# Patient Record
Sex: MALE | Race: WHITE | NOT HISPANIC OR LATINO | URBAN - METROPOLITAN AREA
[De-identification: names, ages, dates, MRNs, and addresses within clinical notes are randomized per-mention and may not be internally consistent; named-entity substitution may affect disease eponyms.]

---

## 2019-08-05 ENCOUNTER — HOSPITAL ENCOUNTER (EMERGENCY)
Facility: HOSPITAL | Age: 44
Discharge: HOME | End: 2019-08-05
Attending: INTERNAL MEDICINE
Payer: MEDICAID

## 2019-08-05 VITALS
TEMPERATURE: 98 F | HEART RATE: 76 BPM | OXYGEN SATURATION: 99 % | WEIGHT: 275 LBS | SYSTOLIC BLOOD PRESSURE: 151 MMHG | RESPIRATION RATE: 16 BRPM | DIASTOLIC BLOOD PRESSURE: 73 MMHG

## 2019-08-05 DIAGNOSIS — S49.90XA SHOULDER INJURY, INITIAL ENCOUNTER: ICD-10-CM

## 2019-08-05 DIAGNOSIS — S99.912A ANKLE INJURY, LEFT, INITIAL ENCOUNTER: ICD-10-CM
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
## 2019-08-05 PROCEDURE — 99284 EMERGENCY DEPT VISIT MOD MDM: CPT | Mod: ER

## 2019-08-05 RX ORDER — IBUPROFEN 800 MG/1
800 TABLET ORAL EVERY 6 HOURS PRN
Qty: 20 TABLET | Refills: 0 | Status: SHIPPED | OUTPATIENT
Start: 2019-08-05

## 2019-08-05 RX ORDER — METHOCARBAMOL 500 MG/1
1000 TABLET, FILM COATED ORAL 3 TIMES DAILY
Qty: 30 TABLET | Refills: 0 | Status: SHIPPED | OUTPATIENT
Start: 2019-08-05 | End: 2019-08-10

## 2019-08-05 NOTE — ED PROVIDER NOTES
"Encounter Date: 8/5/2019    SCRIBE #1 NOTE: I, Amy Rahman, am scribing for, and in the presence of,  CHETNA Mcneill. I have scribed the following portions of the note - Other sections scribed: HPI, ROS, PE.       History     Chief Complaint   Patient presents with    Back Pain     Pt states," I fell off a ladder three days ago and have pain in my lower back and left hip."     Ariel Hinson is a 44 y.o. male who presents to the ED complaining of left lower back pain s/p falling off roof 2 days ago. The ladder slipped and he fell with the ladder. No LOC or head injury. Denies history of back problems. Denies numbness or tingling in legs. No incontinence. He is taking Aleve for pain. Reports left ankle and left shoulder pain, but notes that pain has improved. He is able to walk on ankle.    The history is provided by the patient.     Review of patient's allergies indicates:   Allergen Reactions    Bactrim [sulfamethoxazole-trimethoprim] Nausea Only     History reviewed. No pertinent past medical history.  History reviewed. No pertinent surgical history.  History reviewed. No pertinent family history.  Social History     Tobacco Use    Smoking status: Never Smoker    Smokeless tobacco: Never Used   Substance Use Topics    Alcohol use: Not on file    Drug use: Not on file     Review of Systems   Respiratory: Negative for shortness of breath.    Cardiovascular: Negative for chest pain.   Genitourinary: Negative for enuresis.   Musculoskeletal: Positive for arthralgias and back pain. Negative for gait problem.   Skin: Negative for wound.   Neurological: Negative for syncope, numbness and headaches.   All other systems reviewed and are negative.      Physical Exam     Initial Vitals [08/05/19 1211]   BP Pulse Resp Temp SpO2   (!) 151/73 76 16 97.7 °F (36.5 °C) 99 %      MAP       --         Physical Exam    Nursing note and vitals reviewed.  Constitutional: He appears well-developed and well-nourished. "   HENT:   Head: Normocephalic and atraumatic.   Eyes: Conjunctivae are normal.   Neck: Normal range of motion. Neck supple.   Cardiovascular: Normal rate, regular rhythm, normal heart sounds and intact distal pulses. Exam reveals no gallop and no friction rub.    No murmur heard.  Pulmonary/Chest: Breath sounds normal. No respiratory distress. He has no wheezes. He has no rhonchi. He has no rales.   Musculoskeletal: Normal range of motion. He exhibits tenderness (left shoulder, ankle, back ). He exhibits no edema.   Left lumbar paraspinous tenderness. No midline tenderness. Tenderness to lateral malleolous of left ankle.   Neurological: He is alert and oriented to person, place, and time. He has normal strength. No cranial nerve deficit or sensory deficit. GCS score is 15. GCS eye subscore is 4. GCS verbal subscore is 5. GCS motor subscore is 6.   Skin: Skin is warm, dry and intact. Capillary refill takes less than 2 seconds.   Psychiatric: He has a normal mood and affect. His behavior is normal.         ED Course   Procedures  Labs Reviewed - No data to display       Imaging Results          X-Ray Shoulder 2 or More Views Left (Final result)  Result time 08/05/19 13:41:16   Procedure changed from X-Ray Shoulder Trauma Left     Final result by Bucky Rosas MD (08/05/19 13:41:16)                 Impression:      No acute displaced fracture-dislocation identified.      Electronically signed by: Bucky Rosas MD  Date:    08/05/2019  Time:    13:41             Narrative:    EXAMINATION:  XR SHOULDER COMPLETE 2 OR MORE VIEWS LEFT    CLINICAL HISTORY:  pain;Unspecified injury of shoulder and upper arm, unspecified arm, initial encounter    TECHNIQUE:  Two or three views of the left shoulder were performed.    COMPARISON:  None    FINDINGS:  Bones are well mineralized. Overall alignment is within normal limits. No displaced fracture, dislocation or destructive osseous process.  Minimal degenerative change at the AC  joint.  No subcutaneous emphysema or radiodense retained foreign body.  Left lung apex is clear.                               X-Ray Lumbar Spine Ap And Lateral (Final result)  Result time 08/05/19 13:29:29    Final result by Yung Da Silva MD (08/05/19 13:29:29)                 Impression:      No acute findings      Electronically signed by: Yung Da Silva MD  Date:    08/05/2019  Time:    13:29             Narrative:    EXAMINATION:  XR LUMBAR SPINE AP AND LATERAL    CLINICAL HISTORY:  Low back pain, minor trauma;    TECHNIQUE:  AP, lateral and spot images were performed of the lumbar spine.    COMPARISON:  None    FINDINGS:  There is minimal curvature of the lumbar spine to the left.  Otherwise alignment is satisfactory.  No spondylolisthesis is seen.  Body heights and disc space heights are maintained.  No compression fracture identified                               X-Ray Ankle Complete Left (Final result)  Result time 08/05/19 13:27:11    Final result by Yung Da Silva MD (08/05/19 13:27:11)                 Impression:      Degenerative changes.  No fracture or dislocation.      Electronically signed by: Yung Da Silva MD  Date:    08/05/2019  Time:    13:27             Narrative:    EXAMINATION:  XR ANKLE COMPLETE 3 VIEW LEFT    CLINICAL HISTORY:  Unspecified injury of left ankle, initial encounter    TECHNIQUE:  AP, lateral and oblique views of the left ankle were performed.    COMPARISON:  None    FINDINGS:  Left ankle demonstrates normal alignment.  No evidence of fracture is seen.  There is no soft tissue swelling.  Ankle mortise is intact.  Spurring is seen on the lateral view in the calcaneus at the Achilles tendon and plantar fascia insertion sites.                                 Medical Decision Making:   Initial Assessment:   X-rays are negative for acute fracture.  I suspect patient's pain is musculoskeletal.  Patient given Toradol and Robaxin in the ED which improved pain. I will discharge  patient home with ibuprofen and Robaxin.  Patient is stable for discharge.            Scribe Attestation:   Scribe #1: I performed the above scribed service and the documentation accurately describes the services I performed. I attest to the accuracy of the note.    The document was produced by a scribe under my direction and in my presence.  I agree with the content of the note and have made any necessary edits.    Erica BASILIO            Clinical Impression:     1. Fall, initial encounter    2. Ankle injury, left, initial encounter    3. Shoulder injury, initial encounter            Disposition:   Disposition: Discharged  Condition: Stable                        CHETNA Rivera  08/12/19 1040